# Patient Record
Sex: MALE | Race: WHITE
[De-identification: names, ages, dates, MRNs, and addresses within clinical notes are randomized per-mention and may not be internally consistent; named-entity substitution may affect disease eponyms.]

---

## 2019-07-11 PROBLEM — Z00.00 ENCOUNTER FOR PREVENTIVE HEALTH EXAMINATION: Status: ACTIVE | Noted: 2019-07-11

## 2019-07-12 ENCOUNTER — RX RENEWAL (OUTPATIENT)
Age: 34
End: 2019-07-12

## 2019-07-12 DIAGNOSIS — M17.11 UNILATERAL PRIMARY OSTEOARTHRITIS, RIGHT KNEE: ICD-10-CM

## 2019-08-08 ENCOUNTER — OTHER (OUTPATIENT)
Age: 34
End: 2019-08-08

## 2019-08-19 ENCOUNTER — OTHER (OUTPATIENT)
Age: 34
End: 2019-08-19

## 2019-08-20 ENCOUNTER — OTHER (OUTPATIENT)
Age: 34
End: 2019-08-20

## 2019-08-20 ENCOUNTER — RX RENEWAL (OUTPATIENT)
Age: 34
End: 2019-08-20

## 2019-08-22 ENCOUNTER — OTHER (OUTPATIENT)
Age: 34
End: 2019-08-22

## 2019-08-26 ENCOUNTER — OTHER (OUTPATIENT)
Age: 34
End: 2019-08-26

## 2019-08-28 ENCOUNTER — APPOINTMENT (OUTPATIENT)
Dept: ORTHOPEDIC SURGERY | Facility: CLINIC | Age: 34
End: 2019-08-28
Payer: COMMERCIAL

## 2019-08-28 VITALS — HEIGHT: 72 IN | WEIGHT: 200 LBS | BODY MASS INDEX: 27.09 KG/M2

## 2019-08-28 DIAGNOSIS — M25.562 PAIN IN RIGHT KNEE: ICD-10-CM

## 2019-08-28 DIAGNOSIS — M25.561 PAIN IN RIGHT KNEE: ICD-10-CM

## 2019-08-28 PROCEDURE — 99213 OFFICE O/P EST LOW 20 MIN: CPT | Mod: 25

## 2019-08-28 PROCEDURE — 20611 DRAIN/INJ JOINT/BURSA W/US: CPT | Mod: 50

## 2019-08-28 NOTE — PROCEDURE
[de-identified] : Patient has demonstrated limited relief from NSAIDS, rest, exercises / PT , and after discussion of the risks and benefits, the patient has elected to proceed with a\par n ULTRASOUND GUIDED VISCOSUPPLEMENT injection into the BILATERAL SupeLat PF Knee\par  \par Confirmed that the patient does not have history of prior adverse reactions, active, infections, or relevant allergies. There was no effusion, erythema, or warmth, and the skin was clear\par \par The skin was sterilized with alcohol. Ethyl Chloride was used as a topical anesthetic. Routine sterile technique. \par  \par The KNEE JOINT  was injected utilizing ULTRASOUND GUIDANCE with MONOVISC 4CC. The injection was completed without complication and a bandage was applied.\par \par The patient tolerated the procedure well and was given post-injection instructions.Rec: Cold therapy, analgesics, avoid heavy activity.\par \par MEDICATION: MONOVISC 4CC\par \par EFFUSION ASPIRATED  : NONE \par

## 2019-08-28 NOTE — DISCUSSION/SUMMARY
[de-identified] : POST INJECTION INSTRUCTIONS\par \par COLD THERAPY , ANALGESICS PRN\par \par HOME STRETCHING AND EXERCISES QD\par \par START P.T.  2 WEEKS AFTER INJECTION \par \par

## 2019-08-28 NOTE — PHYSICAL EXAM
[de-identified] : PHYSICAL EXAM BILATERAL KNEES\par \par AROM\par RIGHT  0- 130\par LEFT    0-130 \par \par SPECIAL TESTS\par \par PATELLAR GRIND = GRIND \par DRAWER  = NEG\par LACHMAN = NEG\par MACMURRAY = NEG \par \par MOTOR = GROSSLY INTACT\par SENSORY = GROSSLY INTACT \par \par \par

## 2019-08-28 NOTE — HISTORY OF PRESENT ILLNESS
[Pain Location] : pain [] : right & left knee [5] : an average pain level of 5/10 [Running] : worsened by running [de-identified] : BI LATERAL KNEE PAIN\par 2018\par PAIN LEVEL 5/10\par ACHY PAIN\par LOCALIZED\par BETTER WITH ICE AND REST\par WORSE WHEN RUNNING\par GRINDING\par STIFFNESS\par SWELLING\par PREVIOUS GEL INJECTIONS HELPED FOR 3-4 MONTHS

## 2020-06-10 ENCOUNTER — APPOINTMENT (OUTPATIENT)
Dept: ORTHOPEDIC SURGERY | Facility: CLINIC | Age: 35
End: 2020-06-10
Payer: COMMERCIAL

## 2020-06-10 VITALS — TEMPERATURE: 97 F

## 2020-06-10 PROCEDURE — 99213 OFFICE O/P EST LOW 20 MIN: CPT

## 2020-06-10 NOTE — PHYSICAL EXAM
[de-identified] : PHYSICAL EXAM BILATERAL KNEES\par \par AROM\par RIGHT  0- 130\par LEFT    0-130 \par \par SPECIAL TESTS\par \par PATELLAR GRIND = GRIND \par DRAWER  = NEG\par LACHMAN = NEG\par MACMURRAY = NEG \par \par MOTOR = GROSSLY INTACT\par SENSORY = GROSSLY INTACT \par \par \par

## 2020-06-10 NOTE — HISTORY OF PRESENT ILLNESS
[de-identified] : BI LATERAL KNEE PAIN\par FOLLOW UP\par PAIN LEVEL 8/10\par SWELLING\par STIFFNESS\par PREVIOUS GEL INJECTIONS HELPED FOR 3-4 MONTHS

## 2020-06-19 ENCOUNTER — APPOINTMENT (OUTPATIENT)
Dept: PHYSICAL MEDICINE AND REHAB | Facility: CLINIC | Age: 35
End: 2020-06-19
Payer: COMMERCIAL

## 2020-06-19 VITALS — TEMPERATURE: 97.7 F

## 2020-06-19 PROCEDURE — 99213 OFFICE O/P EST LOW 20 MIN: CPT | Mod: 25

## 2020-06-19 PROCEDURE — 20611 DRAIN/INJ JOINT/BURSA W/US: CPT | Mod: 50

## 2020-06-19 NOTE — PROCEDURE
[de-identified] : Ultrasound Guided Bilateral Knee Euflexxa Injections\par \par After informed consent, he was placed in a seated position. Hair was trimmed with scissors. Injection site was localized using anatomical landmarks. The skin was sterilely prepped. Using routine sterile technique and an anterolateral approach, Euflexxa was instilled using ultrasound guidance. The same procedure was repeated on the right side. There were no complications and a bandage was applied. He  tolerated the procedure well and was given post-injection instructions. Rec: Cold therapy, analgesics, avoid heavy activity.\par

## 2020-06-19 NOTE — HISTORY OF PRESENT ILLNESS
[FreeTextEntry1] : Location: knee pain\par Severity: moderate\par Duration: over 1 yr\par Timing: chronic\par Aggravating Factors: not being active\par Alleviating Factors: HEP\par Associated Symptoms: denies weight loss, fever, chills, \par Prior Studies: MRI\par

## 2020-06-26 ENCOUNTER — APPOINTMENT (OUTPATIENT)
Dept: PHYSICAL MEDICINE AND REHAB | Facility: CLINIC | Age: 35
End: 2020-06-26
Payer: COMMERCIAL

## 2020-06-26 PROCEDURE — 99213 OFFICE O/P EST LOW 20 MIN: CPT | Mod: 25

## 2020-06-26 PROCEDURE — 20611 DRAIN/INJ JOINT/BURSA W/US: CPT | Mod: 50

## 2020-06-26 NOTE — HISTORY OF PRESENT ILLNESS
[FreeTextEntry1] : Location: knee pain\par Severity: moderate\par Duration: over 1 yr\par Timing: chronic\par Aggravating Factors: not being active\par Alleviating Factors: HEP\par Associated Symptoms: denies weight loss, fever, chills, +swelling for couple days after injection but resolved now, also had some ecchymosis which resolved\par Prior Studies: MRI\par \par

## 2020-06-26 NOTE — ASSESSMENT
[FreeTextEntry1] : Ice area when he gets home and for next couple days.  [facility]

## 2020-06-26 NOTE — PHYSICAL EXAM
[FreeTextEntry1] : 35 year yo male in NAD and normal body habitus\par \par \par Gait - normal\par no swelling, erythema, warmth\par flexion to 120 deg\par no TTP in patellar and quad tendon, medial/lateral joint\par 5/5 knee ext\par

## 2020-06-26 NOTE — PROCEDURE
[de-identified] : Ultrasound Guided Bilateral Knee Euflexxa Injections\par \par After informed consent, he was placed in a seated position. Injection site was localized using anatomical landmarks and U/S. The skin was sterilely prepped. Using routine sterile technique and an anterolateral approach, Euflexxa was instilled using ultrasound guidance. The same procedure was repeated on the right side. There were no complications and a bandage was applied. He tolerated the procedure well and was given post-injection instructions. Rec: Cold therapy, analgesics, avoid heavy activity.\par

## 2020-07-02 ENCOUNTER — APPOINTMENT (OUTPATIENT)
Dept: PHYSICAL MEDICINE AND REHAB | Facility: CLINIC | Age: 35
End: 2020-07-02
Payer: COMMERCIAL

## 2020-07-02 VITALS — TEMPERATURE: 97.1 F

## 2020-07-02 DIAGNOSIS — T79.A29A TRAUMATIC COMPARTMENT SYNDROME OF UNSPECIFIED LOWER EXTREMITY, INITIAL ENCOUNTER: ICD-10-CM

## 2020-07-02 PROCEDURE — 20611 DRAIN/INJ JOINT/BURSA W/US: CPT | Mod: 50

## 2020-07-02 NOTE — PHYSICAL EXAM
[FreeTextEntry1] : 35 year yo male in NAD and normal body habitus\par \par \par Gait - normal\par mild swelling on right, none on left; no erythema, warmth\par chronic right lower leg swelling from compartment syndrome s/p surgery

## 2020-07-02 NOTE — PROCEDURE
[de-identified] : Ultrasound Guided Bilateral Knee Euflexxa Injections\par \par After informed consent, he was placed in a seated position. Injection site was localized using anatomical landmarks and U/S. The skin was sterilely prepped. Using routine sterile technique and an anterolateral approach, Euflexxa was instilled using ultrasound guidance. The same procedure was repeated on the right side. There were no complications and a bandage was applied. He tolerated the procedure well and was given post-injection instructions. Rec: Cold therapy, analgesics, avoid heavy activity.\par  \par

## 2020-07-02 NOTE — ASSESSMENT
[FreeTextEntry1] : He still has not had an relief from the injections and he gets swelling and pain in the knee after the injections.  Monovisc worked perfectly fine the last 3 rounds so that would be the best option next time.  \par \par Told to be careful with NSAIDs. Watch out for GI bleeding, blood in stool, and stomach irritation.  Educated not to take more than 1000 mg Aleve per day.  Does not want PPI. \par

## 2020-07-16 ENCOUNTER — TRANSCRIPTION ENCOUNTER (OUTPATIENT)
Age: 35
End: 2020-07-16

## 2020-10-30 ENCOUNTER — APPOINTMENT (OUTPATIENT)
Dept: OTOLARYNGOLOGY | Facility: CLINIC | Age: 35
End: 2020-10-30
Payer: COMMERCIAL

## 2020-10-30 VITALS — TEMPERATURE: 98.7 F | BODY MASS INDEX: 27.09 KG/M2 | HEIGHT: 72 IN | WEIGHT: 200 LBS

## 2020-10-30 DIAGNOSIS — Z80.9 FAMILY HISTORY OF MALIGNANT NEOPLASM, UNSPECIFIED: ICD-10-CM

## 2020-10-30 DIAGNOSIS — Z83.3 FAMILY HISTORY OF DIABETES MELLITUS: ICD-10-CM

## 2020-10-30 DIAGNOSIS — R42 DIZZINESS AND GIDDINESS: ICD-10-CM

## 2020-10-30 DIAGNOSIS — H90.42 SENSORINEURAL HEARING LOSS, UNILATERAL, LEFT EAR, WITH UNRESTRICTED HEARING ON THE CONTRALATERAL SIDE: ICD-10-CM

## 2020-10-30 DIAGNOSIS — Z87.39 PERSONAL HISTORY OF OTHER DISEASES OF THE MUSCULOSKELETAL SYSTEM AND CONNECTIVE TISSUE: ICD-10-CM

## 2020-10-30 DIAGNOSIS — H93.13 TINNITUS, BILATERAL: ICD-10-CM

## 2020-10-30 DIAGNOSIS — Z78.9 OTHER SPECIFIED HEALTH STATUS: ICD-10-CM

## 2020-10-30 PROCEDURE — 99203 OFFICE O/P NEW LOW 30 MIN: CPT

## 2020-10-30 PROCEDURE — 92557 COMPREHENSIVE HEARING TEST: CPT

## 2020-10-30 PROCEDURE — 92567 TYMPANOMETRY: CPT

## 2020-10-30 PROCEDURE — 99072 ADDL SUPL MATRL&STAF TM PHE: CPT

## 2020-10-30 RX ORDER — HYALURONATE SODIUM 20 MG/2 ML
20 SYRINGE (ML) INTRAARTICULAR
Qty: 2 | Refills: 0 | Status: DISCONTINUED | OUTPATIENT
Start: 2020-06-10 | End: 2020-10-30

## 2020-10-30 RX ORDER — HYALURONATE SODIUM, STABILIZED 88 MG/4 ML
88 SYRINGE (ML) INTRAARTICULAR
Qty: 2 | Refills: 0 | Status: DISCONTINUED | OUTPATIENT
Start: 2020-03-12 | End: 2020-10-30

## 2020-10-30 RX ORDER — HYALURONATE SODIUM, STABILIZED 88 MG/4 ML
88 SYRINGE (ML) INTRAARTICULAR
Qty: 1 | Refills: 0 | Status: DISCONTINUED | OUTPATIENT
Start: 2019-07-12 | End: 2020-10-30

## 2020-10-30 RX ORDER — HYALURONATE SODIUM, STABILIZED 88 MG/4 ML
88 SYRINGE (ML) INTRAARTICULAR
Qty: 2 | Refills: 0 | Status: DISCONTINUED | OUTPATIENT
Start: 2019-08-20 | End: 2020-10-30
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
## 2020-10-30 NOTE — ASSESSMENT
[FreeTextEntry1] : RIAZ MALDONADO has severe dizziness which is sometimes positional. ENT evaluation is wnl. He may have vestibular migraine.\par I referred him for neurological evaluation.\par Thank you for referring this patient. I will keep you informed of his progress.

## 2020-10-30 NOTE — CONSULT LETTER
[Dear  ___] : Dear  [unfilled], [Consult Letter:] : I had the pleasure of evaluating your patient, [unfilled]. [Please see my note below.] : Please see my note below. [Consult Closing:] : Thank you very much for allowing me to participate in the care of this patient.  If you have any questions, please do not hesitate to contact me. [Sincerely,] : Sincerely, [FreeTextEntry3] : Robert Lynch MD\par

## 2020-10-30 NOTE — REVIEW OF SYSTEMS
[Post Nasal Drip] : post nasal drip [Nasal Congestion] : nasal congestion [Sinus Pain] : sinus pain [Sinus Pressure] : sinus pressure [Negative] : Heme/Lymph [Patient Intake Form Reviewed] : Patient intake form was reviewed

## 2022-03-11 ENCOUNTER — APPOINTMENT (OUTPATIENT)
Dept: ORTHOPEDIC SURGERY | Facility: CLINIC | Age: 37
End: 2022-03-11
Payer: COMMERCIAL

## 2022-03-11 PROCEDURE — 99213 OFFICE O/P EST LOW 20 MIN: CPT | Mod: 25

## 2022-03-11 PROCEDURE — 73562 X-RAY EXAM OF KNEE 3: CPT | Mod: 50

## 2022-03-11 PROCEDURE — 20611 DRAIN/INJ JOINT/BURSA W/US: CPT | Mod: 50

## 2022-03-11 RX ORDER — HYALURONATE SODIUM, STABILIZED 60 MG/3 ML
60 SYRINGE (ML) INTRAARTICULAR
Refills: 0 | Status: COMPLETED | OUTPATIENT
Start: 2022-03-11

## 2022-03-11 RX ADMIN — Medication 0 MG/3ML: at 00:00

## 2022-03-11 NOTE — PROCEDURE
[de-identified] : Patient has demonstrated limited relief from NSAIDS, rest, exercises / PT , and after discussion of the risks and benefits, the patient has elected to proceed with a\par n ULTRASOUND GUIDED VISCOSUPPLEMENT injection into the BILATERAL SupeLat PF Knee\par  \par Confirmed that the patient does not have history of prior adverse reactions, active, infections, or relevant allergies. There was no effusion, erythema, or warmth, and the skin was clear\par \par The skin was sterilized with alcohol. Ethyl Chloride was used as a topical anesthetic. Routine sterile technique. \par  \par The KNEE JOINT  was injected utilizing ULTRASOUND GUIDANCE with  DURALANE 3CC. The injection was completed without complication and a bandage was applied.\par \par The patient tolerated the procedure well and was given post-injection instructions.Rec: Cold therapy, analgesics, avoid heavy activity.\par \par MEDICATION: DURALANE 3CC\par \par EFFUSION ASPIRATED  : NONE \par

## 2022-03-11 NOTE — DISCUSSION/SUMMARY
[de-identified] : XRAYS REVEAL MILD  KNEE OSTEOARTHRITIS\par LITTLE RELIEF FROM NSAIDS , EXERCISES \par PATIENT HAS ELECTED TO PROCEED WITH KENALOG INJECTION KNEE  \par RISKS AND BENEFITS DISCUSSED - VERBAL CONSENT OBTAINED \par SEE PROCEDURE NOTE\par \par \par POST INJECTION INSTRUCTIONS:\par \par INJECTION THERAPY HANDOUT PROVIDED\par \par COLD THERAPY , ANALGESICS PRN\par \par HOME STRETCHING AND YOGA\par \par REPEAT VISCOSUPPPLEMENT 6 MONTHS

## 2022-03-11 NOTE — PHYSICAL EXAM
[de-identified] : PHYSICAL EXAM BILATERAL KNEES\par \par AROM\par RIGHT  0- 140\par LEFT    0-135\par \par SPECIAL TESTS\par \par PATELLAR GRIND = GRIND \par DRAWER  = NEG\par LACHMAN = NEG\par MACMURRAY = NEG \par \par MOTOR = GROSSLY INTACT\par SENSORY = GROSSLY INTACT \par \par \par  [de-identified] : XRAY LEFT KNEE:\par 4 views of the knee\par MILD OA , SCLEROSIS\par No obvious fracture or dislocation. \par Alignment within normal limits\par \par \par XRAY RIGHT KNEE:\par 4 views of the knee\par MILD OA , SCLEROSIS\par No obvious fracture or dislocation. \par Alignment within normal limits\par \par \par

## 2022-03-11 NOTE — HISTORY OF PRESENT ILLNESS
[de-identified] : BI LATERAL KNEE PAIN\par RIGHT WORSE THAN LEFT \par FOLLOW UP\par PAIN LEVEL 5/10\par GRIND IN RIGHT KNEE \par STIFFNESS\par MIKEL 10, 2020- GEL INJECTION-HELPFUL FOR 5 MONTHS \par PREVIOUS GEL INJECTIONS HELPED FOR 3-4 MONTHS

## 2022-03-25 ENCOUNTER — APPOINTMENT (OUTPATIENT)
Dept: OTOLARYNGOLOGY | Facility: CLINIC | Age: 37
End: 2022-03-25
Payer: COMMERCIAL

## 2022-03-25 DIAGNOSIS — H91.92 UNSPECIFIED HEARING LOSS, LEFT EAR: ICD-10-CM

## 2022-03-25 DIAGNOSIS — T16.2XXA FOREIGN BODY IN LEFT EAR, INITIAL ENCOUNTER: ICD-10-CM

## 2022-03-25 PROCEDURE — 69200 CLEAR OUTER EAR CANAL: CPT

## 2022-03-25 PROCEDURE — 99212 OFFICE O/P EST SF 10 MIN: CPT | Mod: 25

## 2022-03-25 RX ORDER — NAPROXEN SODIUM 220 MG
TABLET ORAL
Refills: 0 | Status: ACTIVE | COMMUNITY
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
## 2022-03-25 NOTE — CONSULT LETTER
[Dear  ___] : Dear  [unfilled], [Courtesy Letter:] : I had the pleasure of seeing your patient, [unfilled], in my office today. [Please see my note below.] : Please see my note below. [Consult Closing:] : Thank you very much for allowing me to participate in the care of this patient.  If you have any questions, please do not hesitate to contact me. [Sincerely,] : Sincerely, [FreeTextEntry3] : Luz Watts MD\par

## 2022-03-25 NOTE — ASSESSMENT
[FreeTextEntry1] : He had a foreign body in the left ear consistent with a beeswax ear plug which was removed.  The canal and tympanic membrane were normal.  He did feel better afterwards.\par \par He has a history of dizziness in intermittent mild sinus pain.  It has improved since he saw Dr. Lynch\par \par PLAN\par \par -findings and management options discussed in detail with the patient. \par -good aural hygiene\par -avoid using cotton swabs in the ears\par -He will avoid using the beeswax earplugs.  He said that he has silicone earplugs and foam earplugs at home that he uses.  He may also consider custom-made earplugs\par -He declined an audiogram to check his hearing\par -If he wishes to proceed with further work-up and management for the dizziness, he should return.  He said it is not bad at this time\par -follow up persistent or worsening symptoms

## 2022-03-30 ENCOUNTER — APPOINTMENT (OUTPATIENT)
Dept: ORTHOPEDIC SURGERY | Facility: CLINIC | Age: 37
End: 2022-03-30
Payer: COMMERCIAL

## 2022-03-30 PROCEDURE — 20611 DRAIN/INJ JOINT/BURSA W/US: CPT | Mod: 50

## 2022-03-30 PROCEDURE — 99213 OFFICE O/P EST LOW 20 MIN: CPT | Mod: 25

## 2022-03-30 NOTE — PROCEDURE
[de-identified] : INJECTION / ASPIRATION BILATERAL KNEES\par \par Patient has demonstrated limited relief from NSAIDS, rest, exercises / PT , and after discussion of the risks and benefits, the patient has elected to proceed with a\par n ULTRASOUND GUIDED corticosteroid injection into the BILATERAL SupeLat PF Knee\par  \par Confirmed that the patient does not have history of prior adverse reactions, active, infections, or relevant allergies. There was no effusion, erythema, or warmth, and the skin was clear\par \par The skin was sterilized with alcohol. Ethyl Chloride was used as a topical anesthetic. Routine sterile technique. \par  \par The KNEE JOINT  was injected utilizing ULTRASOUND GUIDANCEwith KENALOG + LIDOCAINE. The injection was completed without complication and a bandage was applied.\par \par The patient tolerated the procedure well and was given post-injection instructions.Rec: Cold therapy, analgesics, avoid heavy activity.\par \par MEDICATION: Lidocaine 1% 4cc + 10mg of Kenalog\par
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
## 2022-03-30 NOTE — DISCUSSION/SUMMARY
[de-identified] : XRAYS REVEAL MILD  KNEE OSTEOARTHRITIS\par LITTLE RELIEF FROM NSAIDS , EXERCISES \par PATIENT HAS ELECTED TO PROCEED WITH KENALOG INJECTION KNEE  \par RISKS AND BENEFITS DISCUSSED - VERBAL CONSENT OBTAINED \par SEE PROCEDURE NOTE\par \par \par POST INJECTION INSTRUCTIONS:\par \par INJECTION THERAPY HANDOUT PROVIDED\par \par COLD THERAPY , ANALGESICS PRN\par \par HOME STRETCHING AND YOGA\par \par REPEAT VISCOSUPPPLEMENT SEPTEMBER

## 2022-03-30 NOTE — PHYSICAL EXAM
[de-identified] : PHYSICAL EXAM BILATERAL KNEES\par \par AROM\par RIGHT  0- 130 \par LEFT    0- \par \par SPECIAL TESTS\par \par PATELLAR GRIND = GRIND \par DRAWER  = NEG\par LACHMAN = NEG\par MACMURRAY = NEG \par \par MOTOR = GROSSLY INTACT\par SENSORY = GROSSLY INTACT \par \par \par

## 2022-09-08 RX ORDER — HYALURONATE SODIUM, STABILIZED 88 MG/4 ML
88 SYRINGE (ML) INTRAARTICULAR
Qty: 2 | Refills: 0 | Status: ACTIVE | OUTPATIENT
Start: 2022-09-08

## 2023-05-04 RX ORDER — HYALURONATE SODIUM, STABILIZED 88 MG/4 ML
88 SYRINGE (ML) INTRAARTICULAR
Qty: 2 | Refills: 0 | Status: ACTIVE | OUTPATIENT
Start: 2023-05-04

## 2023-05-17 RX ORDER — HYALURONATE SODIUM, STABILIZED 88 MG/4 ML
88 SYRINGE (ML) INTRAARTICULAR
Qty: 2 | Refills: 0 | Status: ACTIVE | COMMUNITY
Start: 2023-05-08 | End: 1900-01-01

## 2023-05-17 RX ORDER — HYALURONATE SODIUM, STABILIZED 88 MG/4 ML
88 SYRINGE (ML) INTRAARTICULAR
Qty: 2 | Refills: 0 | Status: ACTIVE | OUTPATIENT
Start: 2023-05-17

## 2023-06-15 ENCOUNTER — APPOINTMENT (OUTPATIENT)
Dept: ORTHOPEDIC SURGERY | Facility: CLINIC | Age: 38
End: 2023-06-15
Payer: COMMERCIAL

## 2023-06-15 PROCEDURE — 99213 OFFICE O/P EST LOW 20 MIN: CPT | Mod: 25

## 2023-06-15 PROCEDURE — 20611 DRAIN/INJ JOINT/BURSA W/US: CPT | Mod: 50
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
## 2023-06-15 NOTE — DISCUSSION/SUMMARY
[de-identified] : XRAYS REVEAL KNEE OSTEOARTHRITIS\par LITTLE RELIEF FROM NSAIDS , EXERCISES \par PATIENT HAS ELECTED TO PROCEED WITH MONOVISC  INJECTION KNEE  \par RISKS AND BENEFITS DISCUSSED - VERBAL CONSENT OBTAINED \par SEE PROCEDURE NOTE\par \par \par POST INJECTION INSTRUCTIONS:\par \par INJECTION THERAPY HANDOUT PROVIDED\par \par COLD THERAPY , ANALGESICS PRN\par \par HOME STRETCHING AND EXERCISES QD  -  KNEE OA HANDOUT \par ELASTIC KNEE SUPPORT PRN\par SUPPORTIVE SHOES WITH ARCH SUPPORT \par \par HOME EXERCISES AS PER P.T. \par REPEAT MONOVISC 6 MONTHS \par CONSIDER PRP\par

## 2023-06-15 NOTE — PROCEDURE
[de-identified] : Patient has demonstrated limited relief from NSAIDS, rest, exercises / PT , and after discussion of the risks and benefits, the patient has elected to proceed with a\par n ULTRASOUND GUIDED VISCOSUPPLEMENT injection into the BILATERAL SupeLat PF Knee\par  \par Confirmed that the patient does not have history of prior adverse reactions, active, infections, or relevant allergies. There was no effusion, erythema, or warmth, and the skin was clear\par \par The skin was sterilized with alcohol. Ethyl Chloride was used as a topical anesthetic. Routine sterile technique. \par  \par The KNEE JOINT  was injected utilizing ULTRASOUND GUIDANCE with MONOVISC 4CC. The injection was completed without complication and a bandage was applied.\par \par The patient tolerated the procedure well and was given post-injection instructions.Rec: Cold therapy, analgesics, avoid heavy activity.\par \par MEDICATION: MONOVISC 4CC\par \par EFFUSION ASPIRATED  : NONE \par

## 2023-06-15 NOTE — PHYSICAL EXAM
[de-identified] : PHYSICAL EXAM BILATERAL KNEES\par \par AROM\par RIGHT  0- 130 \par LEFT    0- \par \par SPECIAL TESTS\par \par PATELLAR GRIND = GRIND \par DRAWER  = NEG\par LACHMAN = NEG\par MACMURRAY = NEG \par \par MOTOR = GROSSLY INTACT\par SENSORY = GROSSLY INTACT \par \par \par 
3 Hour(s) 18 Minute(s)

## 2023-11-27 RX ORDER — HYALURONATE SODIUM, STABILIZED 88 MG/4 ML
88 SYRINGE (ML) INTRAARTICULAR
Qty: 2 | Refills: 0 | Status: ACTIVE | OUTPATIENT
Start: 2023-11-27

## 2023-12-21 ENCOUNTER — APPOINTMENT (OUTPATIENT)
Dept: ORTHOPEDIC SURGERY | Facility: CLINIC | Age: 38
End: 2023-12-21
Payer: COMMERCIAL

## 2023-12-21 VITALS — BODY MASS INDEX: 29.8 KG/M2 | HEIGHT: 72 IN | WEIGHT: 220 LBS

## 2023-12-21 PROCEDURE — 73562 X-RAY EXAM OF KNEE 3: CPT | Mod: 50

## 2023-12-21 PROCEDURE — 99203 OFFICE O/P NEW LOW 30 MIN: CPT | Mod: 25

## 2023-12-21 PROCEDURE — 20611 DRAIN/INJ JOINT/BURSA W/US: CPT | Mod: 50

## 2023-12-21 RX ORDER — NAPROXEN 500 MG/1
500 TABLET ORAL
Qty: 60 | Refills: 0 | Status: ACTIVE | COMMUNITY
Start: 2023-12-21 | End: 1900-01-01

## 2023-12-21 NOTE — PROCEDURE
[de-identified] : After discussion of the risks and benefits, the patient presented for their Monovisc injections to the knees.  Confirmed that the patient does not have history of prior adverse reactions, active infections, or relevant allergies.  There was no effusion, erythema, or warmth, and the skin was clear. The skin was prepped in the usual sterile manner, ethyl chloride spray was used as a topical anesthetic.  A needle was inserted  UTILIZING ULTRASOUND GUIDANCE TO LOCALIZE THE NEEDLE IN THE KNEE JOINT into the RIGHT and LEFT KNEE via an anterolateral approach.  Viscosupplement was then slowly injected, The injection was completed without complication and a bandage was applied. The patient tolerated the procedure well and was given post-injection instructions: no exercise x 48 hours, cold packs and analgesics prn.

## 2023-12-21 NOTE — PHYSICAL EXAM
[de-identified] : Bilateral knee  Constitutional:  The patient is healthy-appearing and in no apparent distress.   Gait: The patient ambulates with a normal gait and no limp.  Cardiovascular System:  The capillary refill is less than 2 seconds.   Skin:  There are no skin abnormalities other than prior right knee surgical scars.  Right Knee:   Bony Palpation:  There is no tenderness of the medial joint line.  There is no tenderness of the lateral joint line. There is tenderness of the medial femoral chondyle. There is no tenderness of the lateral femoral chondyle. There is no tenderness of the tibial tubercle. There is no tenderness of the superior patella. There is no tenderness of the inferior patella. There is tenderness of the medial patellar facet. There is tenderness of the lateral patellar facet.  Soft Tissue Palpation:  There is no tenderness of the medial retinaculum. There is no tenderness of the lateral retinaculum. There is no tenderness of the quadriceps tendon. There is no tenderness of the patella tendon. There is no tenderness of the ITB. There is no tenderness of the pes anserine.  Active Range of Motion:  The range of motion at the knee actively and passively is full.   Special Tests:  There is a negative Apley. There is a negative Steinmanns.  There is a negative Lachman and Anterior Drawer. There is a negative Posterior Drawer.   There is no varus or valgus laxity.  Strength:  There is 5/5 hip flexion and 5/5 knee flexion and extension.    Left Knee:   Bony Palpation:  There is no tenderness of the medial joint line.  There is no tenderness of the lateral joint line. There is tenderness of the medial femoral chondyle. There is no tenderness of the lateral femoral chondyle. There is no tenderness of the tibial tubercle. There is no tenderness of the superior patella. There is no tenderness of the inferior patella. There is tenderness of the medial patellar facet. There is tenderness of the lateral patellar facet.  Soft Tissue Palpation:  There is no tenderness of the medial retinaculum. There is no tenderness of the lateral retinaculum. There is no tenderness of the quadriceps tendon. There is no tenderness of the patella tendon. There is no tenderness of the ITB. There is no tenderness of the pes anserine.  Active Range of Motion:  The range of motion at the knee actively and passively is full.   Special Tests:  There is a negative Apley. There is a negative Steinmanns.  There is a negative Lachman and Anterior Drawer. There is a negative Posterior Drawer.   There is no varus or valgus laxity.  Strength:  There is 5/5 hip flexion and 5/5 knee flexion and extension.    Psychiatric:  The patient demonstrates a normal mood and affect and is active and alert  [de-identified] : X-ray bilateral knee: There is mild medial and patellofemoral arthritis 76.3

## 2023-12-21 NOTE — HISTORY OF PRESENT ILLNESS
[de-identified] : Initial Visit for Bilateral knee pain/ Dr Kenisha patient  Prior Studies: x rays ordered  Symptoms: minor swelling in right knee / right worse then left Pain Level: 4/10 Pain Medication: Alev

## 2023-12-21 NOTE — ASSESSMENT
[FreeTextEntry1] : Discussed at length with patient treatment options and at this time he elects home exercises and viscosupplementation.  Patient to follow-up on an as-needed basis

## 2024-05-02 DIAGNOSIS — M17.12 UNILATERAL PRIMARY OSTEOARTHRITIS, LEFT KNEE: ICD-10-CM

## 2024-05-02 RX ORDER — HYALURONATE SODIUM, STABILIZED 88 MG/4 ML
88 SYRINGE (ML) INTRAARTICULAR
Qty: 1 | Refills: 0 | Status: ACTIVE | OUTPATIENT
Start: 2024-05-02

## 2024-05-02 RX ORDER — HYALURONATE SODIUM, STABILIZED 88 MG/4 ML
88 SYRINGE (ML) INTRAARTICULAR
Qty: 2 | Refills: 0 | Status: ACTIVE | OUTPATIENT
Start: 2024-05-02

## 2024-06-19 ENCOUNTER — APPOINTMENT (OUTPATIENT)
Dept: ORTHOPEDIC SURGERY | Facility: CLINIC | Age: 39
End: 2024-06-19
Payer: COMMERCIAL

## 2024-06-19 DIAGNOSIS — M17.0 BILATERAL PRIMARY OSTEOARTHRITIS OF KNEE: ICD-10-CM

## 2024-06-19 PROCEDURE — 20611 DRAIN/INJ JOINT/BURSA W/US: CPT | Mod: 50

## 2024-06-19 PROCEDURE — 99213 OFFICE O/P EST LOW 20 MIN: CPT | Mod: 25

## 2024-07-01 PROBLEM — M17.0 ARTHRITIS OF BOTH KNEES: Status: ACTIVE | Noted: 2019-08-20

## 2024-07-01 NOTE — PHYSICAL EXAM
[de-identified] : Bilateral knee  Constitutional:  The patient is healthy-appearing and in no apparent distress.   Gait: The patient ambulates with a normal gait and no limp.  Cardiovascular System:  The capillary refill is less than 2 seconds.   Skin:  There are no skin abnormalities other than prior right knee surgical scars.  Right Knee:   Bony Palpation:  There is no tenderness of the medial joint line.  There is no tenderness of the lateral joint line. There is tenderness of the medial femoral chondyle. There is no tenderness of the lateral femoral chondyle. There is no tenderness of the tibial tubercle. There is no tenderness of the superior patella. There is no tenderness of the inferior patella. There is tenderness of the medial patellar facet. There is tenderness of the lateral patellar facet.  Soft Tissue Palpation:  There is no tenderness of the medial retinaculum. There is no tenderness of the lateral retinaculum. There is no tenderness of the quadriceps tendon. There is no tenderness of the patella tendon. There is no tenderness of the ITB. There is no tenderness of the pes anserine.  Active Range of Motion:  The range of motion at the knee actively and passively is full.   Special Tests:  There is a negative Apley. There is a negative Steinmanns.  There is a negative Lachman and Anterior Drawer. There is a negative Posterior Drawer.   There is no varus or valgus laxity.  Strength:  There is 5/5 hip flexion and 5/5 knee flexion and extension.    Left Knee:   Bony Palpation:  There is no tenderness of the medial joint line.  There is no tenderness of the lateral joint line. There is tenderness of the medial femoral chondyle. There is no tenderness of the lateral femoral chondyle. There is no tenderness of the tibial tubercle. There is no tenderness of the superior patella. There is no tenderness of the inferior patella. There is tenderness of the medial patellar facet. There is tenderness of the lateral patellar facet.  Soft Tissue Palpation:  There is no tenderness of the medial retinaculum. There is no tenderness of the lateral retinaculum. There is no tenderness of the quadriceps tendon. There is no tenderness of the patella tendon. There is no tenderness of the ITB. There is no tenderness of the pes anserine.  Active Range of Motion:  The range of motion at the knee actively and passively is full.   Special Tests:  There is a negative Apley. There is a negative Steinmanns.  There is a negative Lachman and Anterior Drawer. There is a negative Posterior Drawer.   There is no varus or valgus laxity.  Strength:  There is 5/5 hip flexion and 5/5 knee flexion and extension.    Psychiatric:  The patient demonstrates a normal mood and affect and is active and alert

## 2024-07-01 NOTE — PROCEDURE
[de-identified] : After discussion of the risks and benefits, the patient presented for their Monovisc injections to the knees.  Confirmed that the patient does not have history of prior adverse reactions, active infections, or relevant allergies.  There was no effusion, erythema, or warmth, and the skin was clear. The skin was prepped in the usual sterile manner, ethyl chloride spray was used as a topical anesthetic.  A needle was inserted  UTILIZING ULTRASOUND GUIDANCE TO LOCALIZE THE NEEDLE IN THE KNEE JOINT into the RIGHT and LEFT KNEE via an anterolateral approach.  Viscosupplement was then slowly injected, The injection was completed without complication and a bandage was applied. The patient tolerated the procedure well and was given post-injection instructions: no exercise x 48 hours, cold packs and analgesics prn.

## 2024-07-01 NOTE — HISTORY OF PRESENT ILLNESS
[de-identified] : Last Visit: 12/21/2023 Reason: Bilateral knee pain - Gel injection( Monovisc) Pain:3/10 discomfort  Symptoms: soreness

## 2024-07-01 NOTE — ASSESSMENT
[FreeTextEntry1] : Discussed at length with patient underlying arthritis at this time patient elects bilateral knee Monovisc he will follow-up on an as-needed basis